# Patient Record
Sex: FEMALE | Race: WHITE
[De-identification: names, ages, dates, MRNs, and addresses within clinical notes are randomized per-mention and may not be internally consistent; named-entity substitution may affect disease eponyms.]

---

## 2019-09-07 NOTE — EDM.PDOC
Scribed by Ailyn Leal 09/07/19 1024 Sandeep Mendoza MD





ED HPI GENERAL MEDICAL PROBLEM





- General


Chief Complaint: Genitourinary Problem


Stated Complaint: BLADDER INFECTION


Time Seen by Provider: 09/07/19 10:15


Source of Information: Reports: Patient, RN, RN Notes Reviewed


History Limitations: Reports: No Limitations





- History of Present Illness


INITIAL COMMENTS - FREE TEXT/NARRATIVE: 


Patient presents to ER with hematuria and dysuria. She started yesterday with 

burning followed by onset of hematuria, urgency 5/10. No fever, chills, flank 

pain, nausea or vomiting. 


Onset: Gradual


Onset Date: 09/06/19


Duration: Getting Worse


Location: Reports: Other (Urinary)


Quality: Reports: Burning


Severity: Moderate


Improves with: Reports: None


Worsens with: Reports: None


Associated Symptoms: Reports: No Other Symptoms





- Related Data


 Allergies











Allergy/AdvReac Type Severity Reaction Status Date / Time


 


Penicillins Allergy Unknown Cannot Verified 09/07/19 10:24





   Remember  


 


azithromycin Allergy  Cannot Verified 04/20/16 09:05





   Remember  


 


doxycycline Allergy  Cannot Verified 04/20/16 09:05





   Remember  


 


levofloxacin [From Levaquin] Allergy  Abdominal Verified 04/20/16 09:05





   Pain  


 


omeprazole Allergy  Cannot Verified 04/20/16 09:05





   Remember  


 


pravastatin Allergy  Cannot Verified 04/20/16 09:05





   Remember  


 


rabeprazole Allergy  Cannot Verified 04/20/16 09:05





   Remember  


 


simvastatin Allergy  Cannot Verified 04/20/16 09:05





   Remember  


 


sulfamethoxazole Allergy  Cannot Verified 04/20/16 09:05





[From Bactrim]   Remember  


 


trimethoprim [From Bactrim] Allergy  Cannot Verified 04/20/16 09:05





   Remember  











Home Meds: 


 Home Meds





Docusate Sodium [Colace] 100 - 200 mg PO ASDIRECTED 03/15/16 [History]


Estrogens, Conjugated [Premarin] 0.5 tab PO DAILY 03/15/16 [History]


Hydrochlorothiazide 25 mg PO DAILY 03/15/16 [History]


Levothyroxine [Synthroid] 50 mcg PO ACBREAKFAST 03/15/16 [History]


Lisinopril [Zestril] 40 mg PO DAILY 03/15/16 [History]


Multivitamin [Multi-Vitamin Daily] 1 tab PO DAILY 03/15/16 [History]


Potassium Chloride [Klor-Con 10] 30 meq PO DAILY 03/15/16 [History]


Rosuvastatin Calcium [Crestor] 5 mg PO DAILY 03/15/16 [History]


Vitamin E Mixed [Vitamin E] 400 unit PO DAILY 03/15/16 [History]


Acetaminophen [Acetaminophen ER] 650 mg PO Q8HR PRN 07/22/19 [History]


Isosorbide Mononitrate [Imdur] 30 mg PO DAILY 07/22/19 [History]


Magnesium Chloride [Mag-64] 64 mg PO DAILY 07/22/19 [History]


Magnesium Hydroxide [Milk of Magnesia] 5 ml PO ASDIRECTED PRN 07/22/19 [History]


Metoprolol Succinate 50 mg PO BID 07/22/19 [History]


Potassium Chloride 20 meq PO BEDTIME 07/22/19 [History]


amLODIPine [Norvasc] 5 mg PO DAILY 07/22/19 [History]











Past Medical History


HEENT History: Reports: None


Cardiovascular History: Reports: High Cholesterol, Hypertension, Other (See 

Below)


Other Cardiovascular History: Dyslipidemia


Respiratory History: Reports: None


Gastrointestinal History: Reports: Chronic Constipation


Genitourinary History: Reports: Other (See Below) (chronic kidney disease. UTIs.

)


OB/GYN History: Reports: None


Musculoskeletal History: Reports: Arthritis, Back Pain, Chronic, Neck Pain, 

Chronic, Osteoarthritis


Neurological History: Reports: None


Psychiatric History: Reports: Anxiety, Depression


Endocrine/Metabolic History: Reports: Hypoparathyroidism, Other (See Below)


Other Endocrine/Metabolic History: Impaired fasting blood sugar. Hypomagnesia


Hematologic History: Reports: Anemia


Immunologic History: Reports: None


Oncologic (Cancer) History: Reports: Colon


Other Oncologic History: Tubular adenoma (colonic)


Dermatologic History: Reports: None





- Infectious Disease History


Infectious Disease History: Reports: Chicken Pox, Measles





- Past Surgical History


Female  Surgical History: Reports: Hysterectomy (total abdominal with 

bilateral salpingo-oophorectomy.)


Oncologic Surgical History: Reports: Biopsy of Breast





Social & Family History





- Family History


Family Medical History: Noncontributory





- Living Situation & Occupation


Occupation: Retired





ED ROS GENERAL





- Review of Systems


Review Of Systems: ROS reveals no pertinent complaints other than HPI.





ED EXAM, GENERAL





- Physical Exam


Exam: See Below


Exam Limited By: No Limitations


General Appearance: Alert, WD/WN, No Apparent Distress


Throat/Mouth: Normal Voice, No Airway Compromise


Head: Atraumatic, Normocephalic


Respiratory/Chest: No Respiratory Distress, Lungs Clear


Cardiovascular: Regular Rate, Rhythm


GI/Abdominal: Normal Bowel Sounds, Soft, No Distention, Tender (mild suprapubic 

tenderness).  No: Guarding, Rigid, Rebound


Back Exam: Normal Inspection.  No: CVA Tenderness (L), CVA Tenderness (R)


Neurological: Alert, Oriented, No Motor/Sensory Deficits


Psychiatric: Normal Mood


Skin Exam: Warm, Dry, Intact, Normal Color, No Rash





Course





- Vital Signs


Last Recorded V/S: 


 Last Vital Signs











Temp  97.4 F   09/07/19 09:47


 


Pulse  61   09/07/19 09:47


 


Resp  18   09/07/19 09:47


 


BP  153/61 H  09/07/19 09:47


 


Pulse Ox  100   09/07/19 09:47














- Orders/Labs/Meds


Orders: 


 Active Orders 24 hr











 Category Date Time Status


 


 CULTURE URINE [RM] Stat Lab  09/07/19 10:09 Received











Labs: 


 Laboratory Tests











  09/07/19 Range/Units





  10:09 


 


Urine Color  Red  (YELLOW)  


 


Urine Appearance  Turbid  (CLEAR)  


 


Urine pH  5.5  (5.0-9.0)  


 


Ur Specific Gravity  1.020  (1.005-1.030)  


 


Urine Protein  100 H  (NEGATIVE)  


 


Urine Glucose (UA)  Negative  (NEGATIVE)  


 


Urine Ketones  Trace H  (NEGATIVE)  


 


Urine Occult Blood  Large H  (NEGATIVE)  


 


Urine Nitrite  Negative  (NEGATIVE)  


 


Urine Bilirubin  Moderate H  (NEGATIVE)  


 


Urine Urobilinogen  1.0  (0.2-1.0)  mg/dL


 


Ur Leukocyte Esterase  Large H  (NEGATIVE)  


 


Urine RBC  Packed H  /HPF


 


Urine WBC  Packed H  (0-5/HPF)  /HPF


 


Ur Epithelial Cells  Moderate H  (NOT SEEN)  /HPF


 


Amorphous Sediment  Moderate H  (NOT SEEN)  /HPF


 


Urine Bacteria  Moderate H  (0-FEW/HPF)  /HPF


 


Urine Mucus  Moderate H  (NOT SEEN)  /LPF











Meds: 


Medications














Discontinued Medications














Generic Name Dose Route Start Last Admin





  Trade Name Freq  PRN Reason Stop Dose Admin


 


Ciprofloxacin  500 mg  09/07/19 10:41  





  Ciprofloxacin Hcl  PO  09/07/19 10:42  





  ONETIME ONE   





     





     





     





     


 


Nitrofurantoin Macrocrystals  100 mg  09/07/19 10:38  





  Macrobid  PO  09/07/19 10:39  





  ONETIME ONE   





     





     





     





     














Departure





- Departure


Time of Disposition: 10:41


Disposition: Home, Self-Care 01


Condition: Good


Clinical Impression: 


UTI (urinary tract infection)


Qualifiers:


 Urinary tract infection type: acute cystitis Hematuria presence: with 

hematuria Qualified Code(s): N30.01 - Acute cystitis with hematuria








- Discharge Information


*PRESCRIPTION DRUG MONITORING PROGRAM REVIEWED*: Not Applicable


*COPY OF PRESCRIPTION DRUG MONITORING REPORT IN PATIENT TRISH: Not Applicable


Instructions:  Urinary Tract Infection, Adult


Forms:  ED Department Discharge


Additional Instructions: 


Rx: Cipro 500mg


Follow up in clinic in 7 to 10 days for urine recheck.





- My Orders


Last 24 Hours: 


My Active Orders





09/07/19 10:09


CULTURE URINE [RM] Stat 














- Assessment/Plan


Last 24 Hours: 


My Active Orders





09/07/19 10:09


CULTURE URINE [RM] Stat 














I have read and agree with the documentation that has been completed regarding 

this visit. By signing this record, I attest that the documentation was 

completed in my physical presence and is an accurate record of the encounter.

## 2022-07-02 ENCOUNTER — HOSPITAL ENCOUNTER (EMERGENCY)
Dept: HOSPITAL 43 - DL.ED | Age: 87
Discharge: HOME | End: 2022-07-02
Payer: MEDICARE

## 2022-07-02 VITALS — SYSTOLIC BLOOD PRESSURE: 195 MMHG | DIASTOLIC BLOOD PRESSURE: 85 MMHG | HEART RATE: 68 BPM

## 2022-07-02 DIAGNOSIS — I10: ICD-10-CM

## 2022-07-02 DIAGNOSIS — E78.00: ICD-10-CM

## 2022-07-02 DIAGNOSIS — Z88.1: ICD-10-CM

## 2022-07-02 DIAGNOSIS — Z88.5: ICD-10-CM

## 2022-07-02 DIAGNOSIS — J30.2: Primary | ICD-10-CM

## 2022-07-02 DIAGNOSIS — Z79.899: ICD-10-CM

## 2022-07-02 DIAGNOSIS — Z88.0: ICD-10-CM
